# Patient Record
Sex: FEMALE | URBAN - METROPOLITAN AREA
[De-identification: names, ages, dates, MRNs, and addresses within clinical notes are randomized per-mention and may not be internally consistent; named-entity substitution may affect disease eponyms.]

---

## 2022-10-03 ENCOUNTER — TRANSCRIBE ORDERS (OUTPATIENT)
Dept: DIABETES SERVICES | Facility: HOSPITAL | Age: 57
End: 2022-10-03

## 2022-10-03 DIAGNOSIS — E11.9 TYPE 2 DIABETES MELLITUS WITHOUT COMPLICATION, WITHOUT LONG-TERM CURRENT USE OF INSULIN: Primary | ICD-10-CM

## 2022-10-06 ENCOUNTER — TRANSCRIBE ORDERS (OUTPATIENT)
Dept: DIABETES SERVICES | Facility: HOSPITAL | Age: 57
End: 2022-10-06

## 2022-10-06 DIAGNOSIS — E11.9 TYPE 2 DIABETES MELLITUS WITHOUT COMPLICATION, WITHOUT LONG-TERM CURRENT USE OF INSULIN: Primary | ICD-10-CM

## 2024-03-31 PROBLEM — R19.00 PELVIC MASS IN FEMALE: Status: ACTIVE | Noted: 2024-03-31

## 2024-03-31 NOTE — PROGRESS NOTES
Yasmeen Rainey  [id number]  1965      Reason for visit:  8.3 cm Complex left ovarian cyst     Consultation:  Patient is being seen at the request of Winnie Bal MD      History of present illness:  The patient is a 59 y.o. year old female who presents today for treatment and evaluation of the above issues.    Patient initially had right hip and leg pain and underwent an MRI back for work-up which revealed a cyst on kidney.  She then underwent recent CT abd/pelvis on 2/28/2024 which revealed a pelvic mass.  This was then followed by pelvic U/S on 3/7/24 which revealed a large cystic structure associated with the left adnexa with complex wall echoes/nodularity and possible marginal septations measuring 8.3 x 3.5 x 5.2 cm; no free fluid noted.  CA-125 was normal at 8.2 U/ml.     Patient continues to have right hip and leg pain due to bulging disc.  Denies any abdominal pain/pelvic pain.  Appetite decreased with occasional nausea but thinks related to antibiotics that she's taking for cellulitis of leg.  + early satiety. No vomiting.  Weight loss of 5 pounds over last few months.  No abdominal bloating. No chest pain or shortness of breath.  No vaginal bleeding.  Regular BM.  Urinating without difficulties.  No fevers/chills.       Sees urologist on 4/11 for left kidney cyst that was noted on imaging as well.     Patient with prior history of abdominal hysterectomy in 2002 or 2003 secondary to uterine prolapse.  Both ovaries/fallopian tubes left in place.        For new patients, [facility] intake form from 4/1/2024 was reviewed and confirmed.    CT abd/pelvis (2/28/2024):    There is a 10.7 x 9.7 x 8.7 cm cystic mass arising from the superior pole of the left kidney with thin internal septation associated mild peripheral calcifications.  Bosniak 2 cystic mass.  Status post hysterectomy.  10 x 4 x 5.5 cm left adnexal cystic mass.  Fatty liver.    PELVIC U/S (3/7/2024):    Large cystic structure associated with the  left adnexa with complex wall echoes/nodularity and possible marginal septations measuring 8.3 x 3.5 x 5.2 cm.  Visualized ovarian parenchyma appears normal with pulsatile arterial flow.  No free fluid.  Uterus is absent.      CA-125  (3/13/2024):    8.2 U/ml       OBGYN History:  She is a .  (1 c/section)   She does not use HRT. She does not  have a history of abnormal pap smears.  Prior abdominal hysterectomy in  for uterine prolapse     Last mammogram:  2024  normal per patient   Last colonoscopy:  never     Oncologic History:  Oncology/Hematology History    No history exists.         Past Medical History:   Diagnosis Date    Anxiety     Back pain     Bone pain     Depression     High blood pressure     Joint pain     Muscle pain        Past Surgical History:   Procedure Laterality Date    APPENDECTOMY       SECTION      GALLBLADDER SURGERY      HYSTERECTOMY      Partial       MEDICATIONS:    Current Outpatient Medications:     amLODIPine (NORVASC) 10 MG tablet, Take 1 tablet by mouth Every Evening., Disp: , Rfl:     atorvastatin (LIPITOR) 80 MG tablet, TAKE ONE TABLET BY MOUTH EACH NIGHT AT BEDTIME, Disp: , Rfl:     diclofenac (VOLTAREN) 75 MG EC tablet, Take 1 tablet by mouth 2 (Two) Times a Day With Meals., Disp: , Rfl:     doxycycline (MONODOX) 100 MG capsule, Take 1 capsule by mouth Every 12 (Twelve) Hours., Disp: , Rfl:     Lancets (OneTouch Delica Plus Prcyhn74Q) misc, 1 each by Other route 3 (Three) Times a Day. use to test blood sugar 3 times daily, Disp: , Rfl:     Lantus SoloStar 100 UNIT/ML injection pen, INJECT 10 UNITS UNDER THE SKIN EVERY DAY, Disp: , Rfl:     metFORMIN ER (GLUCOPHAGE-XR) 750 MG 24 hr tablet, Take 1 tablet by mouth Daily., Disp: , Rfl:     methocarbamol (ROBAXIN) 500 MG tablet, Take 1 tablet by mouth 3 (Three) Times a Day As Needed. for pain, Disp: , Rfl:     metoprolol succinate XL (TOPROL-XL) 50 MG 24 hr tablet, Take 1 tablet by mouth Daily., Disp: , Rfl:      mupirocin (BACTROBAN) 2 % ointment, Apply  topically to the appropriate area as directed See Admin Instructions. Apply topically three times daily, Disp: , Rfl:     OneTouch Verio test strip, 1 each by Other route 3 (Three) Times a Day. use to test blood sugar 3 times daily, Disp: , Rfl:     pioglitazone (ACTOS) 30 MG tablet, Take 1 tablet by mouth Daily., Disp: , Rfl:     UltiCare Alcohol Swabs 70 % pads, USE FOR blood sugar checks AND insulin injections, Disp: , Rfl:     valsartan (DIOVAN) 320 MG tablet, Take 1 tablet by mouth Daily., Disp: , Rfl:     venlafaxine XR (EFFEXOR-XR) 150 MG 24 hr capsule, TAKE ONE CAPSULE BY MOUTH EACH NIGHT AT BEDTIME, Disp: , Rfl:      Allergies:  is allergic to morphine and penicillins.    Social History:   Social History     Socioeconomic History    Marital status:    Tobacco Use    Smoking status: Every Day     Current packs/day: 0.25     Average packs/day: 0.2 packs/day for 42.2 years (10.6 ttl pk-yrs)     Types: Cigarettes     Start date: 1982    Smokeless tobacco: Never   Vaping Use    Vaping status: Never Used   Substance and Sexual Activity    Alcohol use: Not Currently    Drug use: Never    Sexual activity: Not Currently     Partners: Male     Birth control/protection: Hysterectomy       Family History:    Family History   Problem Relation Age of Onset    Lung cancer Father     Diabetes Mother     Heart disease Mother     Heart attack Brother     Heart attack Brother     Diabetes Brother     Heart failure Sister     Hypertension Sister     Anemia Daughter     Depression Daughter     Anxiety disorder Daughter        Health Maintenance:    Health Maintenance   Topic Date Due    MAMMOGRAM  Never done    URINE MICROALBUMIN  Never done    BMI FOLLOWUP  Never done    COLORECTAL CANCER SCREENING  Never done    Pneumococcal Vaccine 0-64 (1 of 2 - PCV) Never done    DIABETIC EYE EXAM  Never done    Hepatitis B (1 of 3 - 19+ 3-dose series) Never done    TDAP/TD VACCINES (1  "- Tdap) Never done    ZOSTER VACCINE (1 of 2) Never done    HEPATITIS C SCREENING  Never done    ANNUAL PHYSICAL  Never done    DIABETIC FOOT EXAM  Never done    PAP SMEAR  Never done    HEMOGLOBIN A1C  Never done    COVID-19 Vaccine (1 - 2023-24 season) Never done    INFLUENZA VACCINE  08/01/2024       Review of Systems:  Please refer to history of present illness.  Review of systems otherwise negative.  Physical Exam:  Vitals:    04/01/24 0931   BP: 177/88   Pulse: 92   Resp: 17   Temp: 97.3 °F (36.3 °C)   TempSrc: Temporal   SpO2: 95%   Weight: 88.5 kg (195 lb 1.6 oz)   Height: 160 cm (63\")   PainSc:   5   PainLoc: Leg  Comment: Right leg, Buldging disc     Body mass index is 34.56 kg/m².  Wt Readings from Last 3 Encounters:   04/01/24 88.5 kg (195 lb 1.6 oz)     PHQ-9 Depression Screening  Little interest or pleasure in doing things? 0-->not at all   Feeling down, depressed, or hopeless? 0-->not at all   Trouble falling or staying asleep, or sleeping too much?     Feeling tired or having little energy?     Poor appetite or overeating?     Feeling bad about yourself - or that you are a failure or have let yourself or your family down?     Trouble concentrating on things, such as reading the newspaper or watching television?     Moving or speaking so slowly that other people could have noticed? Or the opposite - being so fidgety or restless that you have been moving around a lot more than usual?     Thoughts that you would be better off dead, or of hurting yourself in some way?     PHQ-9 Total Score 0   If you checked off any problems, how difficult have these problems made it for you to do your work, take care of things at home, or get along with other people?         GENERAL: Alert, well-appearing female appearing her stated age who is in no apparent distress.   Obese   HEENT: Sclera anicteric. Head normocephalic, atraumatic. Mucus membranes moist.   NECK: Trachea midline, supple, without masses.  No " "thyromegaly.   BREASTS: Deferred  CARDIOVASCULAR: Normal rate, regular rhythm, no murmurs, rubs, or gallops.  No peripheral edema.  RESPIRATORY: Clear to auscultation bilaterally, normal respiratory effort  BACK:  No CVA tenderness, no vertebral tenderness on palpation  GASTROINTESTINAL:  Abdomen is soft, non-tender, non-distended, no rebound or guarding, no masses, or hernias. No HSM.  Prior well healed abdominal Incisions noted.    SKIN:  Warm, dry, well-perfused.  All visible areas intact.  Patient noted to have scratches along left lower extremity with bandage noted along left posterior thigh.   PSYCHIATRIC: AO x3, with appropriate affect, normal thought processes.  NEUROLOGIC: No focal deficits.  Moves extremities well.  MUSCULOSKELETAL: Normal gait and station.   EXTREMITIES:   No cyanosis, clubbing, symmetric.  LYMPHATICS:  No cervical or inguinal adenopathy noted.     PELVIC exam:  External genitalia are free from lesion. On speculum examination, the vaginal cuff was intact and no lesions were appreciated.  On bimanual examination, no fullness was appreciated.  Uterus, cervix were absent.  There was no significant tenderness.  Unable to appreciate adnexal mass noted on imaging.   Rectovaginal exam with smooth rectovaginal septum.  Rectal sphincter tone is normal.      ECOG PS 0    PROCEDURES:  None    Diagnostic Data:    No Images in the past 120 days found..    No results found for: \"WBC\", \"HGB\", \"HCT\", \"MCV\", \"PLT\", \"NEUTROABS\", \"GLUCOSE\", \"BUN\", \"CREATININE\", \"EGFRIFNONA\", \"EGFRIFAFRI\", \"NA\", \"K\", \"CL\", \"CO2\", \"MG\", \"PHOS\", \"CALCIUM\", \"ALBUMIN\", \"AST\", \"ALT\", \"BILITOT\"  No results found for: \"TSH\"  No results found for: \"FT4\"  No results found for: \"\"      Assessment & Plan   This is a 59 y.o. woman with 8.3 cm complex left ovarian cyst and normal CA-125 presenting for evaluation.        Encounter Diagnosis   Name Primary?    Pelvic mass in female Yes       Complex left ovarian mass.  Discussed " various etiologies of ovarian mass with patient and her daughter in detail including benign and malignant etiologies.  CT and ultrasound images were reviewed.  Patient has a mostly cystic elongated lesion with possible hydrosalpinx and slight septations peripherally.  Patient with intermediate risk lesion given septations though overall appears low risk for malignancy.  Recent  is normal.  Recommendations are to proceed forward surgical resection.  Plan to remove both ovaries and fallopian tubes.  Discussed at the time of the surgery once the left ovary and fallopian tube were removed this to be sent to pathology for frozen section evaluation.  In the event of an ovarian malignancy, will then need to proceed forward with surgical staging consisting of removal of pelvic and periotic lymph nodes as well as omentum and staging biopsies.  Plan is to remove both ovaries and fallopian tubes regardless of diagnosis.  Patient is status post prior hysterectomy.          --Plan for robotic assisted laparoscopic removal of both ovaries and fallopian tubes with possible staging and removal of pelvic periotic lymph nodes, omentum, staging biopsies.        -- Discussed risks of surgery including but not limited to infection, bleeding, injury to neighboring organs such as bowel, bladder, ureters, neighboring blood vessels and nerves, risk of blood clot, lymphedema, death.  Patient understands risks and agrees to proceed forward as outlined above.  Patient is agreeable to blood transfusion if needed.        -- Patient understands that surgery likely will be with Dr. Vegas when she is back from vacation.    2.  History of multiple comorbidities including hypertension, hyperlipidemia, diabetes.  Plan for medical clearance per PCP.  Obtain preoperative labs, chest x-ray and EKG.    3.  10 cm cystic mass involving left kidney, Bosniak 2 on CT imaging.  Patient following up with urology on April 11 for recommendations.      Pain  assessment was performed today as a part of patient’s care.  For patients with pain related to surgery, gynecologic malignancy or cancer treatment, the plan is as noted in the assessment/plan.  For patients with pain not related to these issues, they are to seek any further needed care from a more appropriate provider, such as PCP.      No orders of the defined types were placed in this encounter.      FOLLOW UP: No follow-ups on file.    I spent 60 minutes caring for Yasmeen on this date of service. This time includes time spent by me in the following activities: preparing for the visit, reviewing tests, obtaining and/or reviewing a separately obtained history, performing a medically appropriate examination and/or evaluation, counseling and educating the patient/family/caregiver, ordering medications, tests, or procedures, referring and communicating with other health care professionals, documenting information in the medical record, and care coordination      Electronically Signed by: Keara Cevallos MD  Date: 4/1/2024

## 2024-03-31 NOTE — H&P (VIEW-ONLY)
Yasmeen Rainey  [id number]  1965      Reason for visit:  8.3 cm Complex left ovarian cyst     Consultation:  Patient is being seen at the request of Winnie Bal MD      History of present illness:  The patient is a 59 y.o. year old female who presents today for treatment and evaluation of the above issues.    Patient initially had right hip and leg pain and underwent an MRI back for work-up which revealed a cyst on kidney.  She then underwent recent CT abd/pelvis on 2/28/2024 which revealed a pelvic mass.  This was then followed by pelvic U/S on 3/7/24 which revealed a large cystic structure associated with the left adnexa with complex wall echoes/nodularity and possible marginal septations measuring 8.3 x 3.5 x 5.2 cm; no free fluid noted.  CA-125 was normal at 8.2 U/ml.     Patient continues to have right hip and leg pain due to bulging disc.  Denies any abdominal pain/pelvic pain.  Appetite decreased with occasional nausea but thinks related to antibiotics that she's taking for cellulitis of leg.  + early satiety. No vomiting.  Weight loss of 5 pounds over last few months.  No abdominal bloating. No chest pain or shortness of breath.  No vaginal bleeding.  Regular BM.  Urinating without difficulties.  No fevers/chills.       Sees urologist on 4/11 for left kidney cyst that was noted on imaging as well.     Patient with prior history of abdominal hysterectomy in 2002 or 2003 secondary to uterine prolapse.  Both ovaries/fallopian tubes left in place.        For new patients, [facility] intake form from 4/1/2024 was reviewed and confirmed.    CT abd/pelvis (2/28/2024):    There is a 10.7 x 9.7 x 8.7 cm cystic mass arising from the superior pole of the left kidney with thin internal septation associated mild peripheral calcifications.  Bosniak 2 cystic mass.  Status post hysterectomy.  10 x 4 x 5.5 cm left adnexal cystic mass.  Fatty liver.    PELVIC U/S (3/7/2024):    Large cystic structure associated with the  left adnexa with complex wall echoes/nodularity and possible marginal septations measuring 8.3 x 3.5 x 5.2 cm.  Visualized ovarian parenchyma appears normal with pulsatile arterial flow.  No free fluid.  Uterus is absent.      CA-125  (3/13/2024):    8.2 U/ml       OBGYN History:  She is a .  (1 c/section)   She does not use HRT. She does not  have a history of abnormal pap smears.  Prior abdominal hysterectomy in  for uterine prolapse     Last mammogram:  2024  normal per patient   Last colonoscopy:  never     Oncologic History:  Oncology/Hematology History    No history exists.         Past Medical History:   Diagnosis Date    Anxiety     Back pain     Bone pain     Depression     High blood pressure     Joint pain     Muscle pain        Past Surgical History:   Procedure Laterality Date    APPENDECTOMY       SECTION      GALLBLADDER SURGERY      HYSTERECTOMY      Partial       MEDICATIONS:    Current Outpatient Medications:     amLODIPine (NORVASC) 10 MG tablet, Take 1 tablet by mouth Every Evening., Disp: , Rfl:     atorvastatin (LIPITOR) 80 MG tablet, TAKE ONE TABLET BY MOUTH EACH NIGHT AT BEDTIME, Disp: , Rfl:     diclofenac (VOLTAREN) 75 MG EC tablet, Take 1 tablet by mouth 2 (Two) Times a Day With Meals., Disp: , Rfl:     doxycycline (MONODOX) 100 MG capsule, Take 1 capsule by mouth Every 12 (Twelve) Hours., Disp: , Rfl:     Lancets (OneTouch Delica Plus [id number]) misc, 1 each by Other route 3 (Three) Times a Day. use to test blood sugar 3 times daily, Disp: , Rfl:     Lantus SoloStar 100 UNIT/ML injection pen, INJECT 10 UNITS UNDER THE SKIN EVERY DAY, Disp: , Rfl:     metFORMIN ER (GLUCOPHAGE-XR) 750 MG 24 hr tablet, Take 1 tablet by mouth Daily., Disp: , Rfl:     methocarbamol (ROBAXIN) 500 MG tablet, Take 1 tablet by mouth 3 (Three) Times a Day As Needed. for pain, Disp: , Rfl:     metoprolol succinate XL (TOPROL-XL) 50 MG 24 hr tablet, Take 1 tablet by mouth Daily., Disp: , Rfl:      mupirocin (BACTROBAN) 2 % ointment, Apply  topically to the appropriate area as directed See Admin Instructions. Apply topically three times daily, Disp: , Rfl:     OneTouch Verio test strip, 1 each by Other route 3 (Three) Times a Day. use to test blood sugar 3 times daily, Disp: , Rfl:     pioglitazone (ACTOS) 30 MG tablet, Take 1 tablet by mouth Daily., Disp: , Rfl:     UltiCare Alcohol Swabs 70 % pads, USE FOR blood sugar checks AND insulin injections, Disp: , Rfl:     valsartan (DIOVAN) 320 MG tablet, Take 1 tablet by mouth Daily., Disp: , Rfl:     venlafaxine XR (EFFEXOR-XR) 150 MG 24 hr capsule, TAKE ONE CAPSULE BY MOUTH EACH NIGHT AT BEDTIME, Disp: , Rfl:      Allergies:  is allergic to morphine and penicillins.    Social History:   Social History     Socioeconomic History    Marital status:    Tobacco Use    Smoking status: Every Day     Current packs/day: 0.25     Average packs/day: 0.2 packs/day for 42.2 years (10.6 ttl pk-yrs)     Types: Cigarettes     Start date: 1982    Smokeless tobacco: Never   Vaping Use    Vaping status: Never Used   Substance and Sexual Activity    Alcohol use: Not Currently    Drug use: Never    Sexual activity: Not Currently     Partners: Male     Birth control/protection: Hysterectomy       Family History:    Family History   Problem Relation Age of Onset    Lung cancer Father     Diabetes Mother     Heart disease Mother     Heart attack Brother     Heart attack Brother     Diabetes Brother     Heart failure Sister     Hypertension Sister     Anemia Daughter     Depression Daughter     Anxiety disorder Daughter        Health Maintenance:    Health Maintenance   Topic Date Due    MAMMOGRAM  Never done    URINE MICROALBUMIN  Never done    BMI FOLLOWUP  Never done    COLORECTAL CANCER SCREENING  Never done    Pneumococcal Vaccine 0-64 (1 of 2 - PCV) Never done    DIABETIC EYE EXAM  Never done    Hepatitis B (1 of 3 - 19+ 3-dose series) Never done    TDAP/TD VACCINES (1  "- Tdap) Never done    ZOSTER VACCINE (1 of 2) Never done    HEPATITIS C SCREENING  Never done    ANNUAL PHYSICAL  Never done    DIABETIC FOOT EXAM  Never done    PAP SMEAR  Never done    HEMOGLOBIN A1C  Never done    COVID-19 Vaccine (1 - 2023-24 season) Never done    INFLUENZA VACCINE  08/01/2024       Review of Systems:  Please refer to history of present illness.  Review of systems otherwise negative.  Physical Exam:  Vitals:    04/01/24 0931   BP: 177/88   Pulse: 92   Resp: 17   Temp: 97.3 °F (36.3 °C)   TempSrc: Temporal   SpO2: 95%   Weight: 88.5 kg (195 lb 1.6 oz)   Height: 160 cm (63\")   PainSc:   5   PainLoc: Leg  Comment: Right leg, Buldging disc     Body mass index is 34.56 kg/m².  Wt Readings from Last 3 Encounters:   04/01/24 88.5 kg (195 lb 1.6 oz)     PHQ-9 Depression Screening  Little interest or pleasure in doing things? 0-->not at all   Feeling down, depressed, or hopeless? 0-->not at all   Trouble falling or staying asleep, or sleeping too much?     Feeling tired or having little energy?     Poor appetite or overeating?     Feeling bad about yourself - or that you are a failure or have let yourself or your family down?     Trouble concentrating on things, such as reading the newspaper or watching television?     Moving or speaking so slowly that other people could have noticed? Or the opposite - being so fidgety or restless that you have been moving around a lot more than usual?     Thoughts that you would be better off dead, or of hurting yourself in some way?     PHQ-9 Total Score 0   If you checked off any problems, how difficult have these problems made it for you to do your work, take care of things at home, or get along with other people?         GENERAL: Alert, well-appearing female appearing her stated age who is in no apparent distress.   Obese   HEENT: Sclera anicteric. Head normocephalic, atraumatic. Mucus membranes moist.   NECK: Trachea midline, supple, without masses.  No " "thyromegaly.   BREASTS: Deferred  CARDIOVASCULAR: Normal rate, regular rhythm, no murmurs, rubs, or gallops.  No peripheral edema.  RESPIRATORY: Clear to auscultation bilaterally, normal respiratory effort  BACK:  No CVA tenderness, no vertebral tenderness on palpation  GASTROINTESTINAL:  Abdomen is soft, non-tender, non-distended, no rebound or guarding, no masses, or hernias. No HSM.  Prior well healed abdominal Incisions noted.    SKIN:  Warm, dry, well-perfused.  All visible areas intact.  Patient noted to have scratches along left lower extremity with bandage noted along left posterior thigh.   PSYCHIATRIC: AO x3, with appropriate affect, normal thought processes.  NEUROLOGIC: No focal deficits.  Moves extremities well.  MUSCULOSKELETAL: Normal gait and station.   EXTREMITIES:   No cyanosis, clubbing, symmetric.  LYMPHATICS:  No cervical or inguinal adenopathy noted.     PELVIC exam:  External genitalia are free from lesion. On speculum examination, the vaginal cuff was intact and no lesions were appreciated.  On bimanual examination, no fullness was appreciated.  Uterus, cervix were absent.  There was no significant tenderness.  Unable to appreciate adnexal mass noted on imaging.   Rectovaginal exam with smooth rectovaginal septum.  Rectal sphincter tone is normal.      ECOG PS 0    PROCEDURES:  None    Diagnostic Data:    No Images in the past 120 days found..    No results found for: \"WBC\", \"HGB\", \"HCT\", \"MCV\", \"PLT\", \"NEUTROABS\", \"GLUCOSE\", \"BUN\", \"CREATININE\", \"EGFRIFNONA\", \"EGFRIFAFRI\", \"NA\", \"K\", \"CL\", \"CO2\", \"MG\", \"PHOS\", \"CALCIUM\", \"ALBUMIN\", \"AST\", \"ALT\", \"BILITOT\"  No results found for: \"TSH\"  No results found for: \"FT4\"  No results found for: \"\"      Assessment & Plan   This is a 59 y.o. woman with 8.3 cm complex left ovarian cyst and normal CA-125 presenting for evaluation.        Encounter Diagnosis   Name Primary?    Pelvic mass in female Yes       Complex left ovarian mass.  Discussed " various etiologies of ovarian mass with patient and her daughter in detail including benign and malignant etiologies.  CT and ultrasound images were reviewed.  Patient has a mostly cystic elongated lesion with possible hydrosalpinx and slight septations peripherally.  Patient with intermediate risk lesion given septations though overall appears low risk for malignancy.  Recent  is normal.  Recommendations are to proceed forward surgical resection.  Plan to remove both ovaries and fallopian tubes.  Discussed at the time of the surgery once the left ovary and fallopian tube were removed this to be sent to pathology for frozen section evaluation.  In the event of an ovarian malignancy, will then need to proceed forward with surgical staging consisting of removal of pelvic and periotic lymph nodes as well as omentum and staging biopsies.  Plan is to remove both ovaries and fallopian tubes regardless of diagnosis.  Patient is status post prior hysterectomy.          --Plan for robotic assisted laparoscopic removal of both ovaries and fallopian tubes with possible staging and removal of pelvic periotic lymph nodes, omentum, staging biopsies.        -- Discussed risks of surgery including but not limited to infection, bleeding, injury to neighboring organs such as bowel, bladder, ureters, neighboring blood vessels and nerves, risk of blood clot, lymphedema, death.  Patient understands risks and agrees to proceed forward as outlined above.  Patient is agreeable to blood transfusion if needed.        -- Patient understands that surgery likely will be with Dr. Vegas when she is back from vacation.    2.  History of multiple comorbidities including hypertension, hyperlipidemia, diabetes.  Plan for medical clearance per PCP.  Obtain preoperative labs, chest x-ray and EKG.    3.  10 cm cystic mass involving left kidney, Bosniak 2 on CT imaging.  Patient following up with urology on April 11 for recommendations.      Pain  assessment was performed today as a part of patient’s care.  For patients with pain related to surgery, gynecologic malignancy or cancer treatment, the plan is as noted in the assessment/plan.  For patients with pain not related to these issues, they are to seek any further needed care from a more appropriate provider, such as PCP.      No orders of the defined types were placed in this encounter.      FOLLOW UP: No follow-ups on file.    I spent 60 minutes caring for Yasmeen on this date of service. This time includes time spent by me in the following activities: preparing for the visit, reviewing tests, obtaining and/or reviewing a separately obtained history, performing a medically appropriate examination and/or evaluation, counseling and educating the patient/family/caregiver, ordering medications, tests, or procedures, referring and communicating with other health care professionals, documenting information in the medical record, and care coordination      Electronically Signed by: Keara Cevallos MD  Date: 4/1/2024

## 2024-04-01 ENCOUNTER — OFFICE VISIT (OUTPATIENT)
Dept: GYNECOLOGIC ONCOLOGY | Facility: CLINIC | Age: 59
End: 2024-04-01
Payer: MEDICAID

## 2024-04-01 ENCOUNTER — PREP FOR SURGERY (OUTPATIENT)
Dept: OTHER | Facility: HOSPITAL | Age: 59
End: 2024-04-01
Payer: MEDICAID

## 2024-04-01 VITALS
DIASTOLIC BLOOD PRESSURE: 88 MMHG | WEIGHT: 195.1 LBS | SYSTOLIC BLOOD PRESSURE: 177 MMHG | HEIGHT: 63 IN | RESPIRATION RATE: 17 BRPM | TEMPERATURE: 97.3 F | HEART RATE: 92 BPM | BODY MASS INDEX: 34.57 KG/M2 | OXYGEN SATURATION: 95 %

## 2024-04-01 DIAGNOSIS — R19.00 PELVIC MASS IN FEMALE: Primary | ICD-10-CM

## 2024-04-01 DIAGNOSIS — R19.00 PELVIC MASS: Primary | ICD-10-CM

## 2024-04-01 PROCEDURE — 1125F AMNT PAIN NOTED PAIN PRSNT: CPT | Performed by: OBSTETRICS & GYNECOLOGY

## 2024-04-01 PROCEDURE — 99205 OFFICE O/P NEW HI 60 MIN: CPT | Performed by: OBSTETRICS & GYNECOLOGY

## 2024-04-01 RX ORDER — CELECOXIB 200 MG/1
200 CAPSULE ORAL ONCE
OUTPATIENT
Start: 2024-04-01 | End: 2024-04-01

## 2024-04-01 RX ORDER — LANCETS 30 GAUGE
1 EACH MISCELLANEOUS 3 TIMES DAILY
COMMUNITY
Start: 2024-03-18

## 2024-04-01 RX ORDER — METOPROLOL SUCCINATE 50 MG/1
1 TABLET, EXTENDED RELEASE ORAL DAILY
COMMUNITY
Start: 2024-03-20

## 2024-04-01 RX ORDER — METFORMIN HYDROCHLORIDE 750 MG/1
1 TABLET, EXTENDED RELEASE ORAL DAILY
COMMUNITY

## 2024-04-01 RX ORDER — BLOOD SUGAR DIAGNOSTIC
1 STRIP MISCELLANEOUS 3 TIMES DAILY
COMMUNITY
Start: 2024-03-18

## 2024-04-01 RX ORDER — SCOLOPAMINE TRANSDERMAL SYSTEM 1 MG/1
1 PATCH, EXTENDED RELEASE TRANSDERMAL CONTINUOUS
OUTPATIENT
Start: 2024-04-01 | End: 2024-04-04

## 2024-04-01 RX ORDER — AMLODIPINE BESYLATE 10 MG/1
1 TABLET ORAL EVERY EVENING
COMMUNITY

## 2024-04-01 RX ORDER — SODIUM CHLORIDE 9 MG/ML
40 INJECTION, SOLUTION INTRAVENOUS AS NEEDED
OUTPATIENT
Start: 2024-04-01

## 2024-04-01 RX ORDER — METHOCARBAMOL 500 MG/1
500 TABLET, FILM COATED ORAL 3 TIMES DAILY PRN
COMMUNITY
Start: 2024-01-24

## 2024-04-01 RX ORDER — SODIUM CHLORIDE 0.9 % (FLUSH) 0.9 %
10 SYRINGE (ML) INJECTION EVERY 12 HOURS SCHEDULED
OUTPATIENT
Start: 2024-04-01

## 2024-04-01 RX ORDER — DICLOFENAC SODIUM 75 MG/1
1 TABLET, DELAYED RELEASE ORAL 2 TIMES DAILY WITH MEALS
COMMUNITY

## 2024-04-01 RX ORDER — DOXYCYCLINE 100 MG/1
1 CAPSULE ORAL EVERY 12 HOURS SCHEDULED
COMMUNITY
Start: 2024-03-20

## 2024-04-01 RX ORDER — PREGABALIN 150 MG/1
150 CAPSULE ORAL ONCE
OUTPATIENT
Start: 2024-04-01 | End: 2024-04-01

## 2024-04-01 RX ORDER — PIOGLITAZONEHYDROCHLORIDE 30 MG/1
1 TABLET ORAL DAILY
COMMUNITY

## 2024-04-01 RX ORDER — ACETAMINOPHEN 500 MG
1000 TABLET ORAL ONCE
OUTPATIENT
Start: 2024-04-01 | End: 2024-04-01

## 2024-04-01 RX ORDER — VENLAFAXINE HYDROCHLORIDE 150 MG/1
CAPSULE, EXTENDED RELEASE ORAL
COMMUNITY
Start: 2024-03-18

## 2024-04-01 RX ORDER — ATORVASTATIN CALCIUM 80 MG/1
TABLET, FILM COATED ORAL
COMMUNITY

## 2024-04-01 RX ORDER — SODIUM CHLORIDE 0.9 % (FLUSH) 0.9 %
10 SYRINGE (ML) INJECTION AS NEEDED
OUTPATIENT
Start: 2024-04-01

## 2024-04-01 RX ORDER — VALSARTAN 320 MG/1
1 TABLET ORAL DAILY
COMMUNITY
Start: 2024-03-18

## 2024-04-01 RX ORDER — HEPARIN SODIUM 5000 [USP'U]/ML
5000 INJECTION, SOLUTION INTRAVENOUS; SUBCUTANEOUS ONCE
OUTPATIENT
Start: 2024-04-01 | End: 2024-04-01

## 2024-04-01 RX ORDER — ALCOHOL ANTISEPTIC PADS
PADS, MEDICATED (EA) TOPICAL
COMMUNITY
Start: 2024-03-18

## 2024-04-01 RX ORDER — INSULIN GLARGINE 100 [IU]/ML
INJECTION, SOLUTION SUBCUTANEOUS
COMMUNITY
Start: 2024-03-21

## 2024-04-01 NOTE — PATIENT INSTRUCTIONS
Surgery Instructions            Yasmeen Rainey  [id number]  1965      SURGEON:  Leanna Vegas MD    Surgery Coordinator: Nancy BURNS    Gynecological Oncology  [address] [facility] [address] [place] [address], [facility]  Phone: [phone]                   Fax: [phone]      Pre-Admission Testing Appointment    You will see your primary care provider on 4/8/24 at 200.  You have a Pre-Admission Testing (PAT) appointment on 04/16/2024  at 1230  You will need to be at hospital registration 10 minutes before that time. Directions to PAT and Registration will be listed below.    We understand your time is valuable. To prevent delays, please bring the following to your PAT apt. if it applies to you:  Written physician orders (if given to you by your physician)  All medications in the original bottles including over-the-counter medications (not a list)  Copy of living will or power of  documents   Copy of recent test results (EKG, stress test, echo, heart cath, etc.)  Copy of pacemaker or ICD cards and date of last interrogation   Copy of cardiac clearance letter from your cardiologist or primary care physician if history of heart problems  Name and phone number of your pharmacy, primary care physician and/or cardiologist  CPAP or BiPAP settings    Surgery Appointment      Your surgery has been scheduled on 04/23/2024.  You will need to go to Main Registration to check in at 0600. Then you will be sent to the [address] [place] [address] second floor surgery registration desk to check in.    Nothing by mouth after midnight on 04/22/2024.    If you are feeling sick, have a fever or cough and have seen your PCP let our office know 48 hours prior to surgery. It may be subject to rescheduling.       The Day of Surgery:    Do not chew gum or tobacco, smoke, or eat mints or hard candy. Shower and wash your hair. You may brush your teeth but do not swallow water. Use any wipes that Pre-admission  testing has given you.     Please arrive for surgery as instructed by the pre-op nurse, often one to two hours before your surgery.  Once you are called to go to your pre-op room, no one will be allowed in the pre op room.   Please note no one under age 12 is permitted to stay in the waiting area without supervision.  Remove all jewelry, including rings and piercings. Do not bring valuables to the hospital.  Wear loose-fitting clothing.  Avoid wearing eye makeup or contact lenses  We make every effort to begin surgery at your scheduled start time but delays do occur. We will keep you and your family updated about any delays  Please note: you MUST have a  over the age of 18 to drive you home from the hospital. You may not use Uber, Lyft or a taxi.    Please remember to bring:    Photo ID and current medical insurance card  Advanced directives, living will or power of  (if applicable)  Current list of all medications, including over-the- counter and herbal supplements  List of allergies  CPAP device if you have sleep apnea  Any assistive devices or equipment needed after surgery    While You are In the Pre-Op Room:  The nurse will review your health history and will place an IV (into the vein) in your hand or arm for fluids and medicines.  An anesthesia provider will talk with you about anesthesia and pain control during and after surgery.  A member of the surgical team can answer your questions.    Directions to [facility]  [address] [facility] ? [place] [address] ? [phone]    From [address] and [address] [facility]:  Take [address] to the [address] exit. Go right on [address] to [address]. Right on [address] to [address].   Left on [address] to [facility] which is on the left.    From [address] of [place]:  Get off [address] at the [address] exit. Go left on [address] to [address]. Right on [address] to [address]. Left  on   [address] to [facility] which is on the left.     From the South ([address]):  Follow [address] to approximately one mile inside [address]. [facility] is on the right at [address] and   [facility].     Parking:  Free  Parking - Take Entrance 2 off of [address] and go straight ahead to [address] [place] [address].  Self Parking - Take Entrance 1 off of [address], bear left and follow the road to [facility].    Directions to Registration:  If entering through front of [address] [place] [address] ( parking), take a right and proceed up the hallway connecting [address] [place] [address] to   [address] [place] [address]. Registration is on the left about snf up the denson.    If entering from [facility], take garage elevator to first floor (1), exit to the right and proceed through the doors to outside, follow the covered sidewalk to entrance of [facility], follow signs to [address] [place] [address], this leads to the [facility] lobby and information desk. Proceed past the information desk to the hallway that connects [address] [place] [address] to the [facility]. Registration is on the left about snf up the denson.    Directions to Pre-admission Testing:  Follow directions to Registration and Pre-admission Testing is next door to Registration             PREPARING FOR SURGERY  **Disability or Work Release Forms     Work: The amount of time you will be off work after surgery depends on both your surgery and your job. Discuss this with your doctor before surgery. If you have any questions about this, call your doctor.  You must provide all forms completed and signed to the GYN ONCOLOGY office.    FORM FOR AUHTHORIZATION FOR USE AND/OR DISCLOSURE OF PROCTED HEALTH INFORMATION CAN BE PROVIDED UPON REQUEST.    Preoperative Evaluation and Optimization  If your doctor tells you to get a preoperative evaluation from your primary care provider, cardiologist, or other specialist, it is your responsibility to make sure to  "complete these well before your surgery. We want you to get evaluated to make sure you are as healthy as possible when you have your surgery. If the evaluation, including all recommended testing, is not done in time, your surgery will be postponed.    If you take diabetic medications please consult with the prescriber.  Continue antidepressants, Beta Blockers \"olol\", anti-seizure medication, GERD medication (heartburn), Opioids and Parkinson's medication.  Let us know if you have a history of blood clots or are taking a blood thinner before your surgery, this will need to be held and you will need to discuss this with staff.   If you are taking any weight loss medications please let our staff now. Ideally they will need to be held 2 weeks prior to your procedure.  You are allowed 1 visitor that may remain in the waiting room at both locations.  Visitors cannot come back to pre-op or post-op areas.    Please note: you MUST have a  over the age of 18 to drive you home from the hospital. You may not use Uber, Lyft or a taxi.    Physical Fitness  Research shows that getting more physical activity before surgery can lower your risk for problems after surgery. Walking is a great way to improve your fitness level before surgery. Even if you start walking just a few weeks before surgery, it can make a big difference.     Quit Smoking  If you smoke, your risk of having a lung problem is at least twice that of a non-smoker.    Surgical incisions will not heal as well and you have a higher risk of infection  The heart has to work harder.  It is best to quit smoking 6 to 8 weeks before surgery. This gives your lungs more time to recover.    Outpatient Surgery  You will need to have someone bring you to the hospital, stay in the waiting room during your procedure and take you home at discharge. It is recommended that someone stay with you 24 hours after your procedure.     If you live more than a 4-hour drive away from the " hospital, or live in an area without easy access to an emergency department, we recommend you plan to spend another night or two close to the hospital before you go home. For assistance with hotel, prices and vouchers let our office know and we can let you talk with our Oncology Social worker, Silvia Terrazas.     Post-Operative Visit  You will be scheduled a post-operative appointment for 3 weeks after your surgical procedure. If you do not have an appointment please call the office and have that scheduled.     How to prevent nausea  The best way to prevent nausea is to eat frequent small meals. It is especially important to eat something before taking pain medication. Take your ondansetron if you are feeling nauseous do not wait.    Pain Management after Surgery    If you have kidney disease or liver disease and are not to take ibuprofen or Tylenol please let your doctor or nurse know.     Driving: Do not drive while you are taking prescription pain medications.     It is normal to have some pain after surgery. The goal of managing your acute pain after surgery is to minimize your pain so you feel comfortable enough to get up, take deep breaths, wash, get dressed, and do simple tasks in your home. Some discomfort is likely. We do not expect you to be completely free of pain.   Pain is usually worst the first 24-48 hours after surgery.    What can I do to relieve pain without medications?   Apply heat with a warm compress, hot water bottle, or heating pad. Do not put anything hot directly on your skin or lie on top of it.   Apply cooling with a cold gel pack, bag of peas, or crushed ice. Wrap in a soft cloth or towel.   Do not push or press on your incision. It is normal for your incision to be sore for up to 6 weeks if you push on it.   Unless your doctor gives you a different plan, ibuprofen and acetaminophen are the main medicines you will use to manage your pain.   You may also get a prescription for an opioid  such as oxycodone or hydrocodone. Opioids should only be added as needed to reduce pain that is not adequately relieved by ibuprofen and acetaminophen.                                                                                         Typical Pain Medication Schedule  6 am Ibuprofen 600 mg   9 am acetaminophen 650 mg   12:00 pm Noon Ibuprofen 600 mg   3:00 pm Acetaminophen 650 mg   6:00 pm Ibuprofen 600 mg   9:00 pm Acetaminophen 650 mg   12:00 am Midnight  Ibuprofen 600 mg.      What if this schedule does not control my pain?   Please call the office and let us know at [phone]  Reduce the number and frequency of opioids as soon as you can. Do not take more opioid medication than your doctor has prescribed.   Common side effects and risks of opioids include drowsiness, mental confusion, dizziness, nausea, constipation, itching, dry mouth, and slowed breathing.   Never mix opioids with alcohol, sleep aids or anti-anxiety medications. These are dangerous combinations that increase the harmful effects of opioid pain medication. Many overdose deaths from opioids also involve at least one other drug or alcohol.   It is illegal to sell or share an opioid without a prescription properly issued by a licensed health care prescriber.    What is the best way to stop taking pain medications?  1. Stop opioid use.  2. Stop acetaminophen.  3. Gradually decrease how often you take ibuprofen. It is a good idea to take a 600 mg pill before you start a more tiring activity such as going shopping or for a long walk.  Once you get more active, you may have a day when your pain gets a little worse. If this happens, take ibuprofen. If ibuprofen does not relieve the pain, add acetaminophen.    What do I need to know about bowel movements?   Starting as soon as you get home, take 17 grams of Miralax (one capful) twice a day to keep your stool soft and prevent constipation. It is important to prevent constipation because straining  can damage your stitches. Your stool should be as soft as toothpaste. If your stool gets too loose, cut back to using Miralax only once a day.   If you used a bowel prep before surgery, it is common not to have a bowel movement on the first and second day after surgery.   If you have not had a bowel movement by 7 p.m. on the third day after surgery, do one of the following at bedtime:  Drink 1 ounce (2 tablespoons) of Milk of Magnesia (MOM). If you have used MOM before and know you need to take 2 ounces for it to work for you, it is OK to do this, or Take 2 Senekot tablets.   Go for short walks. Walking and being active will help you have a bowel movement.   If you have not had a bowel movement by noon on the fourth day after surgery, call the clinic where you were seen and ask to speak with a nurse.    What kind of vaginal bleeding is normal?  Spotting of pink or red blood from the vagina is normal. Brown-colored discharge that gradually changes to a light yellow or cream color is also normal and can last up to 6 weeks. The brownish discharge is old blood and often has a strong odor, this is okay. Call us if it becomes heavier or foul smelling or you are saturating a maxi pad within an hour.     At Home after Surgery: If you experience a medical emergency call 911 or have someone drive you to your nearest emergency department.     When should I call my doctor?  Call your doctor right away, any time of the day or night, including on weekends and holidays, if you have any of the following signs or symptoms:   A temperature over 100.4°F (38°C) If you don't have one, please buy a thermometer before your surgery.   Heavy bleeding (soaking a regular pad in an hour or less)   Severe pain in your abdomen or pelvis that the pain medication is not helping   Chest pain or difficulty breathing   Swelling, redness, or pain in your legs   An incision that opens   An incision that is red or hot   Fluid or blood leaking from an  incision   New bruising after leaving the hospital that is large or spreading. A little bit of bruising around an incision is normal.   Nausea and vomiting    Skin rash   Unable to urinate at all   Pain or stinging when you pass urine   Blood or cloudiness in your urine   Non-stop urge to pass urine, but only dribbling when you try to go   A sense that something is wrong.    Caring for post-surgical incisions     Do not have vaginal intercourse until your doctor evaluates you at a postop visit and tells you OK.     Showers: You may shower starting 24 hours after your surgery.    NO BATHS: do not take a tub bath up to 6 weeks after surgery.   Do not put any lotion, oil, gel, or powder on or near your incisions.     For incisions inside your vagina: Incisions inside the vagina are closed with dissolvable stitches. When they dissolve you may see little bits of suture material that look like thin pieces of string on your underwear or on toilet tissue after wiping. This is normal. Do not put anything inside the vagina until your doctor evaluates you at a postop visit and tells you when it will be OK.      For incisions on your skin: If there is a dressing over the incision, remove it before your first shower. Leave the slim adhesive strips that are under the dressing in place. During the week after surgery, they will usually curl up at the edges and then come off on their own. If they are still there a week after surgery, gently remove them.  To clean the incisions, first wash your hands, and then get your hands sudsy with soap and gently wash or let the sudsy water run down over the incisions. Dry the incisions well after washing by gently patting with a towel. You may use a blow dryer, but it must be on a low-heat setting.    When will my bladder function get back to normal?   You received extra fluid through your I.V. while you were in the hospital, so it is normal to urinate (pee) more than usual when you first get  home.   It is normal for your bladder function to be different after surgery. You may notice a pause before your urine stream starts or that your urine stream is slower. This will gradually get better, but it may take up to 6 months before you are back to normal. Be patient, relax, and sit on the toilet a little longer.   Drinking more water than usual will not help the bladder recover faster.    What is a normal energy level?  It is normal to have a decreased energy level after surgery. Listen to your body. If you need to rest, do it. Give yourself permission to take it easy. Once you settle into a normal routine at home, you will find that you slowly begin to feel better. Walking around the house and taking short walks outside will help you get back to normal.    What kind of exercise/activities can I do?   Exercise is important for a healthy recovery. We encourage you to begin normal physical activity, like walking, within hours of surgery. Start with short walks and gradually increase the distance and length of time that you walk.   Allow your body time to heal. Do not restart a difficult exercise routine until you have had your post-op exam and your doctor says it is OK.   Lifting: Unless you are given other instructions, for 6 weeks after your surgery do not lift anything over 15 pounds.   Travel: It is best if you do not go far away from home before your postop visit with your doctor. If you have travel plans, talk to your doctor about this before your surgery.      Financial Assistance:    If you have any questions or need assistance, contact your [facility] financial counseling office from 8:30 a.m.-4:30  p.m. Monday through Friday. Closed weekends.   [place]: [phone] or, or visit at [address] [facility], near the entrance.  Financial Assistance Application available upon request      Patient Payments and Correspondence  Customer service representatives are available to assist you  from 8:00 a.m. to 6:00 p.m. Eastern Standard Time by calling [phone] Monday through Friday. You can also contact us through [employer].    [facility]  [address]  [place] [address] [phone]

## 2024-04-16 ENCOUNTER — ANESTHESIA EVENT (OUTPATIENT)
Dept: PERIOP | Facility: HOSPITAL | Age: 59
End: 2024-04-16
Payer: MEDICAID

## 2024-04-16 ENCOUNTER — HOSPITAL ENCOUNTER (OUTPATIENT)
Dept: GENERAL RADIOLOGY | Facility: HOSPITAL | Age: 59
Discharge: HOME | End: 2024-04-16
Payer: MEDICAID

## 2024-04-16 ENCOUNTER — PRE-ADMISSION TESTING (OUTPATIENT)
Dept: PREADMISSION TESTING | Facility: HOSPITAL | Age: 59
End: 2024-04-16
Payer: MEDICAID

## 2024-04-16 VITALS — HEIGHT: 63 IN | BODY MASS INDEX: 35.08 KG/M2 | WEIGHT: 197.97 LBS

## 2024-04-16 DIAGNOSIS — R19.00 PELVIC MASS: Primary | ICD-10-CM

## 2024-04-16 LAB
ABO GROUP BLD: NORMAL
ALBUMIN SERPL-MCNC: 3.9 G/DL (ref 3.5–5.2)
ALBUMIN/GLOB SERPL: 1.1 G/DL
ALP SERPL-CCNC: 148 U/L (ref 39–117)
ALT SERPL W P-5'-P-CCNC: 28 U/L (ref 1–33)
ANION GAP SERPL CALCULATED.3IONS-SCNC: 12 MMOL/L (ref 5–15)
AST SERPL-CCNC: 25 U/L (ref 1–32)
BASOPHILS # BLD AUTO: 0.05 10*3/MM3 (ref 0–0.2)
BASOPHILS NFR BLD AUTO: 0.5 % (ref 0–1.5)
BILIRUB SERPL-MCNC: 0.8 MG/DL (ref 0–1.2)
BUN SERPL-MCNC: 9 MG/DL (ref 6–20)
BUN/CREAT SERPL: 16.1 (ref 7–25)
CALCIUM SPEC-SCNC: 9.4 MG/DL (ref 8.6–10.5)
CHLORIDE SERPL-SCNC: 98 MMOL/L (ref 98–107)
CO2 SERPL-SCNC: 24 MMOL/L (ref 22–29)
CREAT SERPL-MCNC: 0.56 MG/DL (ref 0.57–1)
DEPRECATED RDW RBC AUTO: 40.1 FL (ref 37–54)
EGFRCR SERPLBLD CKD-EPI 2021: 105.3 ML/MIN/1.73
EOSINOPHIL # BLD AUTO: 0.29 10*3/MM3 (ref 0–0.4)
EOSINOPHIL NFR BLD AUTO: 2.7 % (ref 0.3–6.2)
ERYTHROCYTE [DISTWIDTH] IN BLOOD BY AUTOMATED COUNT: 13.4 % (ref 12.3–15.4)
GLOBULIN UR ELPH-MCNC: 3.5 GM/DL
GLUCOSE SERPL-MCNC: 280 MG/DL (ref 65–99)
HCT VFR BLD AUTO: 42.3 % (ref 34–46.6)
HGB BLD-MCNC: 13.3 G/DL (ref 12–15.9)
IMM GRANULOCYTES # BLD AUTO: 0.06 10*3/MM3 (ref 0–0.05)
IMM GRANULOCYTES NFR BLD AUTO: 0.6 % (ref 0–0.5)
LYMPHOCYTES # BLD AUTO: 2.55 10*3/MM3 (ref 0.7–3.1)
LYMPHOCYTES NFR BLD AUTO: 23.7 % (ref 19.6–45.3)
MCH RBC QN AUTO: 25.9 PG (ref 26.6–33)
MCHC RBC AUTO-ENTMCNC: 31.4 G/DL (ref 31.5–35.7)
MCV RBC AUTO: 82.3 FL (ref 79–97)
MONOCYTES # BLD AUTO: 0.57 10*3/MM3 (ref 0.1–0.9)
MONOCYTES NFR BLD AUTO: 5.3 % (ref 5–12)
NEUTROPHILS NFR BLD AUTO: 67.2 % (ref 42.7–76)
NEUTROPHILS NFR BLD AUTO: 7.23 10*3/MM3 (ref 1.7–7)
NRBC BLD AUTO-RTO: 0 /100 WBC (ref 0–0.2)
PLATELET # BLD AUTO: 243 10*3/MM3 (ref 140–450)
PMV BLD AUTO: 10.1 FL (ref 6–12)
POTASSIUM SERPL-SCNC: 4 MMOL/L (ref 3.5–5.2)
PROT SERPL-MCNC: 7.4 G/DL (ref 6–8.5)
RBC # BLD AUTO: 5.14 10*6/MM3 (ref 3.77–5.28)
RH BLD: POSITIVE
SODIUM SERPL-SCNC: 134 MMOL/L (ref 136–145)
WBC NRBC COR # BLD AUTO: 10.75 10*3/MM3 (ref 3.4–10.8)

## 2024-04-16 PROCEDURE — 86900 BLOOD TYPING SEROLOGIC ABO: CPT

## 2024-04-16 PROCEDURE — 71045 X-RAY EXAM CHEST 1 VIEW: CPT

## 2024-04-16 PROCEDURE — 86901 BLOOD TYPING SEROLOGIC RH(D): CPT

## 2024-04-16 PROCEDURE — 36415 COLL VENOUS BLD VENIPUNCTURE: CPT

## 2024-04-16 PROCEDURE — 93005 ELECTROCARDIOGRAM TRACING: CPT

## 2024-04-16 PROCEDURE — 80053 COMPREHEN METABOLIC PANEL: CPT

## 2024-04-16 PROCEDURE — 85025 COMPLETE CBC W/AUTO DIFF WBC: CPT

## 2024-04-16 NOTE — PAT
Patient to apply Chlorhexadine wipes  to surgical area (as instructed) the night before procedure and the AM of procedure. Wipes provided.    Patient directed to Radiology Department for CXR after Pre Admission Testing Appointment.     Per Anesthesia Request, patient instructed not to take their ACE/ARB medications on the AM of surgery.    Patient viewed general PAT education video as instructed in their preoperative information received from their surgeon.  Patient stated the general PAT education video was viewed in its entirety and survey completed.  Copies of [facility] general education handouts (Incentive Spirometry, Meds to Beds Program, Patient Belongings, Pre-op skin preparation instructions, Blood Glucose testing, Visitor policy, Surgery FAQ, Code H) distributed to patient if not printed. Education related to the PAT pass and skin preparation for surgery (if applicable) completed in PAT as a reinforcement to PAT education video. Patient instructed to return PAT pass provided today as well as completed skin preparation sheet (if applicable) on the day of procedure.     Additionally if patient had not viewed video yet but intended to view it at home or in our waiting area, then referred them to the handout with QR code/link provided during PAT visit.  Instructed patient to complete survey after viewing the video in its entirety.  Encouraged patient/family to read [facility] general education handouts thoroughly and notify PAT staff with any questions or concerns. Patient verbalized understanding of all information and priority content.    Too early to draw type and screen in PAT.  Please obtain blood bank specimen in pre-op on the day of surgery.

## 2024-04-18 LAB
QT INTERVAL: 388 MS
QTC INTERVAL: 439 MS

## 2024-04-22 ENCOUNTER — TELEPHONE (OUTPATIENT)
Dept: GYNECOLOGIC ONCOLOGY | Facility: CLINIC | Age: 59
End: 2024-04-22
Payer: MEDICAID

## 2024-04-22 NOTE — TELEPHONE ENCOUNTER
Confirm surgery on 04/23/2024 . Please arrive at 6:00 am to main registration at [facility].     Npo after midnight on 04/22/2024    thanks

## 2024-04-23 ENCOUNTER — HOSPITAL ENCOUNTER (OUTPATIENT)
Facility: HOSPITAL | Age: 59
Setting detail: HOSPITAL OUTPATIENT SURGERY
Discharge: HOME | End: 2024-04-23
Attending: OBSTETRICS & GYNECOLOGY | Admitting: OBSTETRICS & GYNECOLOGY
Payer: MEDICAID

## 2024-04-23 ENCOUNTER — ANESTHESIA (OUTPATIENT)
Dept: PERIOP | Facility: HOSPITAL | Age: 59
End: 2024-04-23
Payer: MEDICAID

## 2024-04-23 VITALS
HEART RATE: 76 BPM | BODY MASS INDEX: 34.91 KG/M2 | TEMPERATURE: 98.1 F | WEIGHT: 197 LBS | SYSTOLIC BLOOD PRESSURE: 133 MMHG | HEIGHT: 63 IN | DIASTOLIC BLOOD PRESSURE: 81 MMHG | OXYGEN SATURATION: 97 % | RESPIRATION RATE: 16 BRPM

## 2024-04-23 DIAGNOSIS — R19.00 PELVIC MASS: ICD-10-CM

## 2024-04-23 LAB
ABO GROUP BLD: NORMAL
BLD GP AB SCN SERPL QL: NEGATIVE
GLUCOSE BLDC GLUCOMTR-MCNC: 255 MG/DL (ref 70–130)
GLUCOSE BLDC GLUCOMTR-MCNC: 263 MG/DL (ref 70–130)
RH BLD: POSITIVE
T&S EXPIRATION DATE: NORMAL

## 2024-04-23 PROCEDURE — 25010000002 FENTANYL CITRATE (PF) 100 MCG/2ML SOLUTION

## 2024-04-23 PROCEDURE — 25010000002 ONDANSETRON PER 1 MG

## 2024-04-23 PROCEDURE — 25010000002 DEXAMETHASONE PER 1 MG

## 2024-04-23 PROCEDURE — 25810000003 LACTATED RINGERS PER 1000 ML: Performed by: ANESTHESIOLOGY

## 2024-04-23 PROCEDURE — 86850 RBC ANTIBODY SCREEN: CPT | Performed by: OBSTETRICS & GYNECOLOGY

## 2024-04-23 PROCEDURE — 25010000002 PROPOFOL 10 MG/ML EMULSION

## 2024-04-23 PROCEDURE — 63710000001 INSULIN REGULAR HUMAN PER 5 UNITS: Performed by: ANESTHESIOLOGY

## 2024-04-23 PROCEDURE — 58661 LAPAROSCOPY REMOVE ADNEXA: CPT | Performed by: PHYSICIAN ASSISTANT

## 2024-04-23 PROCEDURE — 86901 BLOOD TYPING SEROLOGIC RH(D): CPT | Performed by: OBSTETRICS & GYNECOLOGY

## 2024-04-23 PROCEDURE — 25810000003 SODIUM CHLORIDE PER 500 ML: Performed by: OBSTETRICS & GYNECOLOGY

## 2024-04-23 PROCEDURE — 88307 TISSUE EXAM BY PATHOLOGIST: CPT | Performed by: OBSTETRICS & GYNECOLOGY

## 2024-04-23 PROCEDURE — 86900 BLOOD TYPING SEROLOGIC ABO: CPT | Performed by: OBSTETRICS & GYNECOLOGY

## 2024-04-23 PROCEDURE — 82948 REAGENT STRIP/BLOOD GLUCOSE: CPT

## 2024-04-23 PROCEDURE — 63710000001 INSULIN LISPRO (HUMAN) PER 5 UNITS

## 2024-04-23 PROCEDURE — 88331 PATH CONSLTJ SURG 1 BLK 1SPC: CPT | Performed by: PATHOLOGY

## 2024-04-23 PROCEDURE — 58661 LAPAROSCOPY REMOVE ADNEXA: CPT | Performed by: OBSTETRICS & GYNECOLOGY

## 2024-04-23 PROCEDURE — 25010000002 FENTANYL CITRATE (PF) 50 MCG/ML SOLUTION

## 2024-04-23 PROCEDURE — 25010000002 CEFAZOLIN PER 500 MG: Performed by: OBSTETRICS & GYNECOLOGY

## 2024-04-23 PROCEDURE — 25010000002 HEPARIN (PORCINE) PER 1000 UNITS: Performed by: OBSTETRICS & GYNECOLOGY

## 2024-04-23 PROCEDURE — 25010000002 SUGAMMADEX 200 MG/2ML SOLUTION

## 2024-04-23 RX ORDER — PROMETHAZINE HYDROCHLORIDE 25 MG/1
25 TABLET ORAL ONCE AS NEEDED
Status: DISCONTINUED | OUTPATIENT
Start: 2024-04-23 | End: 2024-04-23 | Stop reason: HOSPADM

## 2024-04-23 RX ORDER — HYDRALAZINE HYDROCHLORIDE 20 MG/ML
5 INJECTION INTRAMUSCULAR; INTRAVENOUS
Status: DISCONTINUED | OUTPATIENT
Start: 2024-04-23 | End: 2024-04-23 | Stop reason: HOSPADM

## 2024-04-23 RX ORDER — SCOLOPAMINE TRANSDERMAL SYSTEM 1 MG/1
1 PATCH, EXTENDED RELEASE TRANSDERMAL CONTINUOUS
Status: DISCONTINUED | OUTPATIENT
Start: 2024-04-23 | End: 2024-04-23 | Stop reason: HOSPADM

## 2024-04-23 RX ORDER — SODIUM CHLORIDE 0.9 % (FLUSH) 0.9 %
10 SYRINGE (ML) INJECTION AS NEEDED
Status: DISCONTINUED | OUTPATIENT
Start: 2024-04-23 | End: 2024-04-23 | Stop reason: HOSPADM

## 2024-04-23 RX ORDER — HYDROCODONE BITARTRATE AND ACETAMINOPHEN 5; 325 MG/1; MG/1
TABLET ORAL
Status: COMPLETED
Start: 2024-04-23 | End: 2024-04-23

## 2024-04-23 RX ORDER — FENTANYL CITRATE 50 UG/ML
INJECTION, SOLUTION INTRAMUSCULAR; INTRAVENOUS
Status: COMPLETED
Start: 2024-04-23 | End: 2024-04-23

## 2024-04-23 RX ORDER — NALOXONE HCL 0.4 MG/ML
0.4 VIAL (ML) INJECTION AS NEEDED
Status: DISCONTINUED | OUTPATIENT
Start: 2024-04-23 | End: 2024-04-23 | Stop reason: HOSPADM

## 2024-04-23 RX ORDER — BUPIVACAINE HYDROCHLORIDE AND EPINEPHRINE 5; 5 MG/ML; UG/ML
INJECTION, SOLUTION PERINEURAL AS NEEDED
Status: DISCONTINUED | OUTPATIENT
Start: 2024-04-23 | End: 2024-04-23 | Stop reason: HOSPADM

## 2024-04-23 RX ORDER — ROCURONIUM BROMIDE 10 MG/ML
INJECTION, SOLUTION INTRAVENOUS AS NEEDED
Status: DISCONTINUED | OUTPATIENT
Start: 2024-04-23 | End: 2024-04-23 | Stop reason: SURG

## 2024-04-23 RX ORDER — HEPARIN SODIUM 5000 [USP'U]/ML
5000 INJECTION, SOLUTION INTRAVENOUS; SUBCUTANEOUS ONCE
Status: DISCONTINUED | OUTPATIENT
Start: 2024-04-23 | End: 2024-04-23 | Stop reason: HOSPADM

## 2024-04-23 RX ORDER — IPRATROPIUM BROMIDE AND ALBUTEROL SULFATE 2.5; .5 MG/3ML; MG/3ML
3 SOLUTION RESPIRATORY (INHALATION) ONCE AS NEEDED
Status: DISCONTINUED | OUTPATIENT
Start: 2024-04-23 | End: 2024-04-23 | Stop reason: HOSPADM

## 2024-04-23 RX ORDER — EPHEDRINE SULFATE 50 MG/ML
INJECTION INTRAVENOUS AS NEEDED
Status: DISCONTINUED | OUTPATIENT
Start: 2024-04-23 | End: 2024-04-23 | Stop reason: SURG

## 2024-04-23 RX ORDER — FENTANYL CITRATE 50 UG/ML
INJECTION, SOLUTION INTRAMUSCULAR; INTRAVENOUS AS NEEDED
Status: DISCONTINUED | OUTPATIENT
Start: 2024-04-23 | End: 2024-04-23 | Stop reason: SURG

## 2024-04-23 RX ORDER — TRAMADOL HYDROCHLORIDE 50 MG/1
50 TABLET ORAL EVERY 6 HOURS PRN
Qty: 5 TABLET | Refills: 0 | Status: SHIPPED | OUTPATIENT
Start: 2024-04-23

## 2024-04-23 RX ORDER — PREGABALIN 150 MG/1
150 CAPSULE ORAL ONCE
Status: COMPLETED | OUTPATIENT
Start: 2024-04-23 | End: 2024-04-23

## 2024-04-23 RX ORDER — DEXAMETHASONE SODIUM PHOSPHATE 4 MG/ML
INJECTION, SOLUTION INTRA-ARTICULAR; INTRALESIONAL; INTRAMUSCULAR; INTRAVENOUS; SOFT TISSUE AS NEEDED
Status: DISCONTINUED | OUTPATIENT
Start: 2024-04-23 | End: 2024-04-23 | Stop reason: SURG

## 2024-04-23 RX ORDER — INSULIN LISPRO 100 [IU]/ML
INJECTION, SOLUTION INTRAVENOUS; SUBCUTANEOUS
Status: COMPLETED
Start: 2024-04-23 | End: 2024-04-23

## 2024-04-23 RX ORDER — FAMOTIDINE 20 MG/1
20 TABLET, FILM COATED ORAL
Status: COMPLETED | OUTPATIENT
Start: 2024-04-23 | End: 2024-04-23

## 2024-04-23 RX ORDER — LIDOCAINE HYDROCHLORIDE 10 MG/ML
INJECTION, SOLUTION EPIDURAL; INFILTRATION; INTRACAUDAL; PERINEURAL AS NEEDED
Status: DISCONTINUED | OUTPATIENT
Start: 2024-04-23 | End: 2024-04-23 | Stop reason: SURG

## 2024-04-23 RX ORDER — SODIUM CHLORIDE 0.9 % (FLUSH) 0.9 %
10 SYRINGE (ML) INJECTION EVERY 12 HOURS SCHEDULED
Status: DISCONTINUED | OUTPATIENT
Start: 2024-04-23 | End: 2024-04-23 | Stop reason: HOSPADM

## 2024-04-23 RX ORDER — ONDANSETRON 2 MG/ML
4 INJECTION INTRAMUSCULAR; INTRAVENOUS ONCE AS NEEDED
Status: COMPLETED | OUTPATIENT
Start: 2024-04-23 | End: 2024-04-23

## 2024-04-23 RX ORDER — ONDANSETRON 2 MG/ML
INJECTION INTRAMUSCULAR; INTRAVENOUS
Status: COMPLETED
Start: 2024-04-23 | End: 2024-04-23

## 2024-04-23 RX ORDER — MIDAZOLAM HYDROCHLORIDE 1 MG/ML
1 INJECTION INTRAMUSCULAR; INTRAVENOUS
Status: DISCONTINUED | OUTPATIENT
Start: 2024-04-23 | End: 2024-04-23 | Stop reason: HOSPADM

## 2024-04-23 RX ORDER — ONDANSETRON 2 MG/ML
INJECTION INTRAMUSCULAR; INTRAVENOUS AS NEEDED
Status: DISCONTINUED | OUTPATIENT
Start: 2024-04-23 | End: 2024-04-23 | Stop reason: SURG

## 2024-04-23 RX ORDER — SODIUM CHLORIDE 0.9 % (FLUSH) 0.9 %
3 SYRINGE (ML) INJECTION EVERY 12 HOURS SCHEDULED
Status: DISCONTINUED | OUTPATIENT
Start: 2024-04-23 | End: 2024-04-23 | Stop reason: HOSPADM

## 2024-04-23 RX ORDER — LIDOCAINE HYDROCHLORIDE 10 MG/ML
0.5 INJECTION, SOLUTION EPIDURAL; INFILTRATION; INTRACAUDAL; PERINEURAL ONCE AS NEEDED
Status: COMPLETED | OUTPATIENT
Start: 2024-04-23 | End: 2024-04-23

## 2024-04-23 RX ORDER — HEPARIN SODIUM 5000 [USP'U]/ML
INJECTION, SOLUTION INTRAVENOUS; SUBCUTANEOUS AS NEEDED
Status: DISCONTINUED | OUTPATIENT
Start: 2024-04-23 | End: 2024-04-23 | Stop reason: HOSPADM

## 2024-04-23 RX ORDER — IBUPROFEN 600 MG/1
600 TABLET ORAL EVERY 6 HOURS PRN
Qty: 30 TABLET | Refills: 0 | Status: SHIPPED | OUTPATIENT
Start: 2024-04-23

## 2024-04-23 RX ORDER — SODIUM CHLORIDE 9 MG/ML
40 INJECTION, SOLUTION INTRAVENOUS AS NEEDED
Status: DISCONTINUED | OUTPATIENT
Start: 2024-04-23 | End: 2024-04-23 | Stop reason: HOSPADM

## 2024-04-23 RX ORDER — HYDROMORPHONE HYDROCHLORIDE 1 MG/ML
0.5 INJECTION, SOLUTION INTRAMUSCULAR; INTRAVENOUS; SUBCUTANEOUS
Status: DISCONTINUED | OUTPATIENT
Start: 2024-04-23 | End: 2024-04-23 | Stop reason: HOSPADM

## 2024-04-23 RX ORDER — DOCUSATE SODIUM 100 MG/1
200 CAPSULE, LIQUID FILLED ORAL 2 TIMES DAILY PRN
Qty: 60 CAPSULE | Refills: 3 | Status: SHIPPED | OUTPATIENT
Start: 2024-04-23

## 2024-04-23 RX ORDER — ONDANSETRON 4 MG/1
4 TABLET, FILM COATED ORAL EVERY 6 HOURS PRN
Qty: 5 TABLET | Refills: 0 | Status: SHIPPED | OUTPATIENT
Start: 2024-04-23

## 2024-04-23 RX ORDER — ACETAMINOPHEN 500 MG
1000 TABLET ORAL ONCE
Status: COMPLETED | OUTPATIENT
Start: 2024-04-23 | End: 2024-04-23

## 2024-04-23 RX ORDER — LABETALOL HYDROCHLORIDE 5 MG/ML
5 INJECTION, SOLUTION INTRAVENOUS
Status: DISCONTINUED | OUTPATIENT
Start: 2024-04-23 | End: 2024-04-23 | Stop reason: HOSPADM

## 2024-04-23 RX ORDER — FENTANYL CITRATE 50 UG/ML
50 INJECTION, SOLUTION INTRAMUSCULAR; INTRAVENOUS
Status: DISCONTINUED | OUTPATIENT
Start: 2024-04-23 | End: 2024-04-23 | Stop reason: HOSPADM

## 2024-04-23 RX ORDER — PROMETHAZINE HYDROCHLORIDE 25 MG/1
25 SUPPOSITORY RECTAL ONCE AS NEEDED
Status: DISCONTINUED | OUTPATIENT
Start: 2024-04-23 | End: 2024-04-23 | Stop reason: HOSPADM

## 2024-04-23 RX ORDER — DROPERIDOL 2.5 MG/ML
0.62 INJECTION, SOLUTION INTRAMUSCULAR; INTRAVENOUS
Status: DISCONTINUED | OUTPATIENT
Start: 2024-04-23 | End: 2024-04-23 | Stop reason: HOSPADM

## 2024-04-23 RX ORDER — SODIUM CHLORIDE 0.9 % (FLUSH) 0.9 %
3-10 SYRINGE (ML) INJECTION AS NEEDED
Status: DISCONTINUED | OUTPATIENT
Start: 2024-04-23 | End: 2024-04-23 | Stop reason: HOSPADM

## 2024-04-23 RX ORDER — PROPOFOL 10 MG/ML
VIAL (ML) INTRAVENOUS AS NEEDED
Status: DISCONTINUED | OUTPATIENT
Start: 2024-04-23 | End: 2024-04-23 | Stop reason: SURG

## 2024-04-23 RX ORDER — CELECOXIB 200 MG/1
200 CAPSULE ORAL ONCE
Status: COMPLETED | OUTPATIENT
Start: 2024-04-23 | End: 2024-04-23

## 2024-04-23 RX ORDER — ACETAMINOPHEN 325 MG/1
650 TABLET ORAL EVERY 6 HOURS PRN
Qty: 30 TABLET | Refills: 0 | Status: SHIPPED | OUTPATIENT
Start: 2024-04-23

## 2024-04-23 RX ORDER — SODIUM CHLORIDE 9 MG/ML
INJECTION, SOLUTION INTRAVENOUS AS NEEDED
Status: DISCONTINUED | OUTPATIENT
Start: 2024-04-23 | End: 2024-04-23 | Stop reason: HOSPADM

## 2024-04-23 RX ORDER — SODIUM CHLORIDE, SODIUM LACTATE, POTASSIUM CHLORIDE, CALCIUM CHLORIDE 600; 310; 30; 20 MG/100ML; MG/100ML; MG/100ML; MG/100ML
9 INJECTION, SOLUTION INTRAVENOUS CONTINUOUS PRN
Status: DISCONTINUED | OUTPATIENT
Start: 2024-04-23 | End: 2024-04-23 | Stop reason: HOSPADM

## 2024-04-23 RX ORDER — DROPERIDOL 2.5 MG/ML
0.62 INJECTION, SOLUTION INTRAMUSCULAR; INTRAVENOUS ONCE AS NEEDED
Status: DISCONTINUED | OUTPATIENT
Start: 2024-04-23 | End: 2024-04-23 | Stop reason: HOSPADM

## 2024-04-23 RX ORDER — HYDROCODONE BITARTRATE AND ACETAMINOPHEN 5; 325 MG/1; MG/1
1 TABLET ORAL ONCE AS NEEDED
Status: DISCONTINUED | OUTPATIENT
Start: 2024-04-23 | End: 2024-04-23 | Stop reason: HOSPADM

## 2024-04-23 RX ADMIN — SODIUM CHLORIDE 2000 MG: 900 INJECTION INTRAVENOUS at 07:50

## 2024-04-23 RX ADMIN — ROCURONIUM BROMIDE 30 MG: 10 INJECTION INTRAVENOUS at 08:14

## 2024-04-23 RX ADMIN — SODIUM CHLORIDE, POTASSIUM CHLORIDE, SODIUM LACTATE AND CALCIUM CHLORIDE 9 ML/HR: 600; 310; 30; 20 INJECTION, SOLUTION INTRAVENOUS at 06:51

## 2024-04-23 RX ADMIN — FENTANYL CITRATE 50 MCG: 50 INJECTION, SOLUTION INTRAMUSCULAR; INTRAVENOUS at 08:24

## 2024-04-23 RX ADMIN — ONDANSETRON 4 MG: 2 INJECTION INTRAMUSCULAR; INTRAVENOUS at 09:49

## 2024-04-23 RX ADMIN — ACETAMINOPHEN 1000 MG: 500 TABLET ORAL at 06:29

## 2024-04-23 RX ADMIN — CELECOXIB 200 MG: 200 CAPSULE ORAL at 06:29

## 2024-04-23 RX ADMIN — FENTANYL CITRATE 50 MCG: 50 INJECTION, SOLUTION INTRAMUSCULAR; INTRAVENOUS at 09:47

## 2024-04-23 RX ADMIN — FAMOTIDINE 20 MG: 20 TABLET, FILM COATED ORAL at 06:29

## 2024-04-23 RX ADMIN — ROCURONIUM BROMIDE 50 MG: 10 INJECTION INTRAVENOUS at 07:40

## 2024-04-23 RX ADMIN — PROPOFOL 25 MCG/KG/MIN: 10 INJECTION, EMULSION INTRAVENOUS at 07:50

## 2024-04-23 RX ADMIN — SUGAMMADEX 200 MG: 100 INJECTION, SOLUTION INTRAVENOUS at 08:56

## 2024-04-23 RX ADMIN — SCOPOLAMINE 1 PATCH: 1.5 PATCH, EXTENDED RELEASE TRANSDERMAL at 06:29

## 2024-04-23 RX ADMIN — LIDOCAINE HYDROCHLORIDE 50 MG: 10 INJECTION, SOLUTION EPIDURAL; INFILTRATION; INTRACAUDAL; PERINEURAL at 07:40

## 2024-04-23 RX ADMIN — EPHEDRINE SULFATE 10 MG: 50 INJECTION INTRAVENOUS at 08:03

## 2024-04-23 RX ADMIN — PROPOFOL 200 MG: 10 INJECTION, EMULSION INTRAVENOUS at 07:40

## 2024-04-23 RX ADMIN — SODIUM CHLORIDE, POTASSIUM CHLORIDE, SODIUM LACTATE AND CALCIUM CHLORIDE: 600; 310; 30; 20 INJECTION, SOLUTION INTRAVENOUS at 08:42

## 2024-04-23 RX ADMIN — LIDOCAINE HYDROCHLORIDE 0.5 ML: 10 INJECTION, SOLUTION EPIDURAL; INFILTRATION; INTRACAUDAL; PERINEURAL at 06:29

## 2024-04-23 RX ADMIN — INSULIN HUMAN 5 UNITS: 100 INJECTION, SOLUTION PARENTERAL at 07:15

## 2024-04-23 RX ADMIN — HYDROCODONE BITARTRATE AND ACETAMINOPHEN 1 TABLET: 5; 325 TABLET ORAL at 10:34

## 2024-04-23 RX ADMIN — FENTANYL CITRATE 50 MCG: 50 INJECTION, SOLUTION INTRAMUSCULAR; INTRAVENOUS at 07:38

## 2024-04-23 RX ADMIN — DEXAMETHASONE SODIUM PHOSPHATE 8 MG: 4 INJECTION, SOLUTION INTRA-ARTICULAR; INTRALESIONAL; INTRAMUSCULAR; INTRAVENOUS; SOFT TISSUE at 07:47

## 2024-04-23 RX ADMIN — PREGABALIN 150 MG: 150 CAPSULE ORAL at 06:29

## 2024-04-23 RX ADMIN — ONDANSETRON 4 MG: 2 INJECTION INTRAMUSCULAR; INTRAVENOUS at 08:55

## 2024-04-23 RX ADMIN — INSULIN LISPRO 6 UNITS: 100 INJECTION, SOLUTION INTRAVENOUS; SUBCUTANEOUS at 09:41

## 2024-04-23 NOTE — OP NOTE
Subjective     Date of Service:  04/23/24  Time of Service:  09:07 EDT    Surgical Staff: Surgeons and Role:     * Leanna Vegas MD - Primary   Additional Staff:   Assistant: José Miguel Ford PA-C  was responsible for performing the following activities: Retraction, Suction, Irrigation, Closing, and Placing Dressing and their skilled assistance was necessary for the success of this case.     Pre-operative diagnosis(es): Pre-Op Diagnosis Codes:     * Pelvic mass [R19.00]     Post-operative diagnosis(es): Post-Op Diagnosis Codes:     * Pelvic mass [R19.00]   Procedure(s): Procedure(s):  DIAGNOSTIC LAPAROSCOPY, SALPINGO OOPHORECTOMY ROBOTIC LAPAROSCOPIC ASSISTED     Antibiotics: cefazolin (Ancef) ordered on call to OR     Anesthesia: Type: General  ASA:  III     Objective      Operative findings: At the time of placement of the abdominal instruments, there is diffuse skin excoriation with satellite like lesions throughout the abdominal area.  There is cutaneous yeast at the inframammary folds.  At the time of laparoscopy, there is an enlarged left adnexal cystic mass that appeared partially decompressed.  There was adhesive disease of the omentum to the anterior abdominal wall and adhesive disease of the colon to the vaginal cuff and left pelvic sidewall.  Left ovary was adhered to the left pelvic sidewall as well. On frozen section of the bilateral ovaries and tubes, no cancer was noted.  Possible fibroma.   Specimens removed: ID Type Source Tests Collected by Time   A :  Tissue Ovaries, Bilateral with Fallopian Tubes TISSUE PATHOLOGY EXAM Leanna Vegas MD 4/23/2024 0835      Fluid Intake and Output: I/O this shift:  In: 1300 [I.V.:1200; IV Piggyback:100]  Out: - Estimated blood loss 25 mL.  Urine output 100 mL.   Blood products used: No   Drains: Urethral Catheter Silicone 16 Fr. (Active)      Implant Information: Nothing was implanted during the procedure   Complications: No immediate   Intraoperative  consult(s):    Condition: stable   Disposition: to PACU and then discharge home       Indications:  Patient's pleasant 59-year-old woman who is diagnosed with an adnexal mass.  Risks and benefits of surgery were discussed.  Consent was signed and on chart.    Procedure: After obtaining informed consent, the patient was taken to the operating room and underwent general endotracheal anesthesia after patient and site verification. The feet were placed in Олег stirrups. The arms were tucked at the sides. Steep Trendelenburg positioning was tested and found to be adequate. The abdomen, perineum, and vagina were prepped and draped in the usual sterile fashion. Fuller catheter was anchored. Attention was turned to the abdomen. Prior to each incision, skin was injected with 0.5% Marcaine with epinephrine.  Varies needle was inserted at the umbilicus and the abdomen was insufflated to pressure 15 mmHg with CO2 gas.  An 8 mm trocar was inserted at the umbilical midline position without difficulty.  Laparoscope was introduced to confirm positioning. Steep Trendelenburg was called for.  2 left-sided 8 mm and 2 right-sided 8 mm trochars were all placed under direct visualization.  The above findings were noted.  Da Sheryl robot was docked to the patient and surgeon retired to the operating console.   Pelvic washings were obtained and later discarded.  Adhesional lysis was performed.  The peritoneum overlying the pelvic sidewalls was divided with monopolar scissors. Infundibulopelvic ligaments were isolated from surrounding structures and pedicles were created using bipolar cautery and scissors.  All residual attachments of the specimen to the patient were serially divided.  Surgical field was irrigated and aspirated.  Good hemostasis was noted. Additional hemostasis was achieved at the pelvis as needed using bipolar cautery. Da Sheryl robot was undocked from the patient and the surgeon returned to the bedside.  15 mm bag was  placed by the umbilicus after extending the incision and specimens were placed in the bag without spillage of contents.  Specimens were sent for frozen section with the above diagnosis.  Suture passer and 0 Vicryl suture times a single stitch was used to reapproximate the fascia at the umbilical site trocar.  At that point, no fascial defects could be palpated.  Trochars were removed under direct visualization.  CO2 gas was allowed to escape from the abdominal cavity.  The skin was closed with 3-0 Monocryl in subcuticular stitch and glue was placed at the skin. Bladder was back filled with 120 mL of sterile solution and the kirkland was discontinued.  The patient was taken to the recovery room in good condition. There were no immediate complications. All counts were correct.          Leanna Vegas MD  04/23/24  09:07 EDT

## 2024-04-23 NOTE — ANESTHESIA PREPROCEDURE EVALUATION
Anesthesia Evaluation     Patient summary reviewed and Nursing notes reviewed   history of anesthetic complications:  prolonged sedation  NPO Solid Status: > 8 hours  NPO Liquid Status: > 2 hours           Airway   Mallampati: I  TM distance: >3 FB  Neck ROM: full  No difficulty expected  Dental    (+) poor dentition    Pulmonary    (+) ,sleep apnea, decreased breath sounds  Cardiovascular   Exercise tolerance: good (4-7 METS)    Patient on routine beta blocker and Beta blocker given within 24 hours of surgery  Rhythm: regular  Rate: normal    (+) hypertension well controlled 2 medications or greater, hyperlipidemia      Neuro/Psych  (+) CVA residual symptoms, psychiatric history Anxiety    ROS Comment: R-SIDED WEAKNESS  GI/Hepatic/Renal/Endo    (+) obesity, GERD well controlled, renal disease- CRI, diabetes mellitus type 2 well controlled using insulin    Musculoskeletal     (+) back pain  Abdominal   (+) obese    Abdomen: soft.   Substance History      OB/GYN          Other   arthritis,                 Anesthesia Plan    ASA 3     general     intravenous induction     Anesthetic plan, risks, benefits, and alternatives have been provided, discussed and informed consent has been obtained with: patient.    Plan discussed with CRNA.    CODE STATUS:

## 2024-04-23 NOTE — DISCHARGE INSTRUCTIONS
1) No driving for 1-2 weeks and no longer taking narcotics.   2) May shower / sponge bathe >24 hours after surgery, No tub baths/soaking  3) Do not lift / push / pull more then 20 lb. for 6 weeks  4) Pelvic rest for 3 weeks  5) Constipation is a common postoperative complaint.  Please use a stool softeners and laxatives as needed to facilitate bowel movements.  6) If you are discharged with an abdominal binder, this is to be used as needed for incisional comfort.    Dr. Vegas's Office [phone]     Follow-up May 15, 2024 at 1:45 pm

## 2024-04-23 NOTE — INTERVAL H&P NOTE
Pre-Op H&P  Yasmeen Rainey  [id number]  1965      Chief complaint:. Ovarian mass      Subjective:  Yasmeen Rainey is a 59 y.o.female with a history of partial hysterectomy presents for scheduled surgery by Dr. Vegas.  She anticipates a salpingo oophorectomy today.  Patient reports she initially had pain and trouble walking.  Imaging revealed a pelvic mass on CT, and large complex cyst on the adnexa.  Normal CA-125. No changes since last seen in clinic.      Review of Systems:  Constitutional-- No fever, chills or sweats. No fatigue.  CV-- No chest pain, palpitation or syncope  Resp-- No SOB, cough, hemoptysis  Skin--No rashes or lesions      Allergies:   Allergies   Allergen Reactions    Penicillins Unknown - Low Severity     Unsure, several siblings with allergy and patients parent could not recall which child, unsure of reaction     Morphine Nausea And Vomiting         Home Meds:  Medications Prior to Admission   Medication Sig Dispense Refill Last Dose    amLODIPine (NORVASC) 10 MG tablet Take 1 tablet by mouth Every Evening.   4/22/2024    atorvastatin (LIPITOR) 80 MG tablet Take 1 tablet by mouth Every Night.   4/22/2024    diclofenac (VOLTAREN) 75 MG EC tablet Take 1 tablet by mouth 2 (Two) Times a Day With Meals.   4/22/2024    metFORMIN ER (GLUCOPHAGE-XR) 750 MG 24 hr tablet Take 1 tablet by mouth Daily.   4/22/2024    metoprolol succinate XL (TOPROL-XL) 50 MG 24 hr tablet Take 1 tablet by mouth Daily.   4/23/2024 at 0330    venlafaxine XR (EFFEXOR-XR) 150 MG 24 hr capsule Take 1 capsule by mouth Daily.   4/22/2024    Lantus SoloStar 100 UNIT/ML injection pen 10 Units Every Night.   4/21/2024    methocarbamol (ROBAXIN) 500 MG tablet Take 1 tablet by mouth 3 (Three) Times a Day As Needed for Muscle Spasms. for pain   More than a month    pioglitazone (ACTOS) 30 MG tablet Take 1 tablet by mouth Daily.   More than a month    valsartan (DIOVAN) 320 MG tablet Take 1 tablet by mouth Daily.   4/21/2024  "        PMH:   Past Medical History:   Diagnosis Date    Anesthesia complication     slow to awaken    Anxiety     Arthritis     Back pain     Bone pain     CVA (cerebral vascular accident)      and , right sided weakness    Depression     Diabetes mellitus     GERD (gastroesophageal reflux disease)     High blood pressure     Hyperlipidemia     Joint pain     Kidney cysts     left    Muscle pain     Ovarian cyst     left     PSH:    Past Surgical History:   Procedure Laterality Date    APPENDECTOMY       SECTION      GALLBLADDER SURGERY      HYSTERECTOMY      Partial       Immunization History:  Influenza: No  Pneumococcal: No  Tetanus: Yes  Covid : No    Social History:   Tobacco:   Social History     Tobacco Use   Smoking Status Former    Current packs/day: 0.00    Average packs/day: 0.3 packs/day for 42.0 years (10.5 ttl pk-yrs)    Types: Cigarettes    Start date:     Quit date:     Years since quittin.3   Smokeless Tobacco Never      Alcohol:     Social History     Substance and Sexual Activity   Alcohol Use Not Currently         Physical Exam:/98 (BP Location: Right arm, Patient Position: Lying)   Pulse 86   Temp 97 °F (36.1 °C) (Temporal)   Resp 16   Ht 160 cm (63\")   Wt 89.4 kg (197 lb)   SpO2 94%   BMI 34.90 kg/m²       General Appearance:    Alert, cooperative, no distress, appears stated age   Head:    Normocephalic, without obvious abnormality, atraumatic   Lungs:     Clear to auscultation bilaterally, respirations unlabored    Heart:   Regular rate and rhythm, S1 and S2 normal    Abdomen:    Soft without tenderness   Extremities:   Extremities normal, atraumatic, no cyanosis or edema   Skin:   Skin color, texture, turgor normal, no rashes or lesions   Neurologic:   Grossly intact     Results Review:     LABS:  Lab Results   Component Value Date    WBC 10.75 2024    HGB 13.3 2024    HCT 42.3 2024    MCV 82.3 2024     2024    " NEUTROABS 7.23 (H) 04/16/2024    GLUCOSE 280 (H) 04/16/2024    BUN 9 04/16/2024    CREATININE 0.56 (L) 04/16/2024     (L) 04/16/2024    K 4.0 04/16/2024    CL 98 04/16/2024    CO2 24.0 04/16/2024    CALCIUM 9.4 04/16/2024    ALBUMIN 3.9 04/16/2024    AST 25 04/16/2024    ALT 28 04/16/2024    BILITOT 0.8 04/16/2024       Cancer Staging (if applicable)  Cancer Patient: __ yes __no X unknown; If yes, clinical stage T:__ N:__M:__, stage group or __N/A      Impression: Complex left ovarian mass      Plan: Diagnostic laparoscopy, salpingo-oophorectomy robotic laparoscopic assisted with possible staging, Dr. Shasta Torres PA-C   4/23/2024   06:53 EDT    I saw and evaluated the patient. I agree with the findings and the plan of care as documented in the note.    Leanna Vegas MD  04/23/24  07:06 EDT

## 2024-04-23 NOTE — ANESTHESIA POSTPROCEDURE EVALUATION
Patient: Yasmeen Rainey    Procedure Summary       Date: 04/23/24 Room / Location:  [facility] [address] [place] [address] [facility]    Anesthesia Start: 0734 Anesthesia Stop: 0916    Procedure: DIAGNOSTIC LAPAROSCOPY, SALPINGO OOPHORECTOMY ROBOTIC LAPAROSCOPIC ASSISTED (Abdomen) Diagnosis:       Pelvic mass      (Pelvic mass [R19.00])    Surgeons: Leanna Vegas MD Provider: José Miguel Huertas MD    Anesthesia Type: general ASA Status: 3            Anesthesia Type: general    Vitals  Vitals Value Taken Time   /85 04/23/24 0916   Temp 97.3 °F (36.3 °C) 04/23/24 0916   Pulse 74 04/23/24 0916   Resp     SpO2 94 % 04/23/24 0916           Post Anesthesia Care and Evaluation    Patient location during evaluation: PACU  Patient participation: waiting for patient participation  Level of consciousness: sleepy but conscious  Pain management: adequate    Airway patency: patent  Anesthetic complications: No anesthetic complications  PONV Status: none  Cardiovascular status: hemodynamically stable and acceptable  Respiratory status: nonlabored ventilation, acceptable and nasal cannula  Hydration status: acceptable

## 2024-04-23 NOTE — ANESTHESIA PROCEDURE NOTES
Airway  Urgency: elective    Date/Time: 4/23/2024 7:45 AM  Airway not difficult    General Information and Staff    Patient location during procedure: OR  CRNA/CAA: Norah Mckeon CRNA    Indications and Patient Condition  Indications for airway management: airway protection    Preoxygenated: yes  MILS not maintained throughout  Mask difficulty assessment: 1 - vent by mask    Final Airway Details  Final airway type: endotracheal airway      Successful airway: ETT  Cuffed: yes   Successful intubation technique: direct laryngoscopy  Facilitating devices/methods: intubating stylet  Endotracheal tube insertion site: oral  Blade: Robert  Blade size: 3  ETT size (mm): 7.0  Cormack-Lehane Classification: grade I - full view of glottis  Placement verified by: chest auscultation and capnometry   Measured from: lips  ETT/EBT  to lips (cm): 20  Number of attempts at approach: 1  Assessment: lips, teeth, and gum same as pre-op and atraumatic intubation    Additional Comments  Negative epigastric sounds, Breath sound equal bilaterally with symmetric chest rise and fall

## 2024-04-24 LAB
CYTO UR: NORMAL
LAB AP CASE REPORT: NORMAL
LAB AP CLINICAL INFORMATION: NORMAL
Lab: NORMAL
PATH REPORT.FINAL DX SPEC: NORMAL
PATH REPORT.GROSS SPEC: NORMAL

## 2024-04-25 ENCOUNTER — TELEPHONE (OUTPATIENT)
Dept: GYNECOLOGIC ONCOLOGY | Facility: CLINIC | Age: 59
End: 2024-04-25
Payer: MEDICAID

## 2024-04-25 NOTE — TELEPHONE ENCOUNTER
Patient called for post-op f/u.  No usage of provided Tramadol, using motrin and tylenol.  Up moving about unassisted in home, positive for BM and voiding well. Eating well, encouraged to focus on protein. Denies any c/o or questions, verified next appt and time. Offers thank you for care.

## 2024-04-25 NOTE — TELEPHONE ENCOUNTER
RN spoke to patient's daughter and reported benign pathology. Daughter verbalized understanding and told RN that she will pass the information along to the patient.   ----- Message from Leanna Vegas MD sent at 4/24/2024 10:22 PM EDT -----  Please notify patient of benign final pathology.  Thanks!  ----- Message -----  From: Lab, Background User  Sent: 4/23/2024   9:02 AM EDT  To: Leanna Vegas MD

## 2024-05-14 NOTE — PROGRESS NOTES
"Yasmeen Rainey  [id number]  1965      Reason for Visit: Postoperative evaluation    History of Present Illness:  Patient is a very pleasant 59 y.o. woman who presents for a post operative evaluation status post Diagnostic Laparoscopy, Salpingo Oophorectomy Robotic Laparoscopic Assisted performed on 4/23/2024.      Surgery and hospital course were uncomplicated.  Today, patient notes normal bowel and bladder function.  Her pain is well controlled. She has questions about resuming normal activities.     Past Medical History, Past Surgical History, Social History, Family History have been reviewed and are without significant changes except as mentioned.    Review of Systems   All other systems were reviewed and are negative except as mentioned above.    Medications:  The current medication list was reviewed in the EMR    ALLERGIES:    Allergies   Allergen Reactions    Penicillins Unknown - Low Severity     Unsure, several siblings with allergy and patients parent could not recall which child, unsure of reaction     Morphine Nausea And Vomiting         BP (!) 187/87   Pulse 74   Temp 97.1 °F (36.2 °C) (Temporal)   Resp 17   Ht 160 cm (62.99\")   Wt 89.7 kg (197 lb 12.5 oz)   SpO2 98%   BMI 35.04 kg/m²   ECOG score: 1       Physical Exam  Constitutional:  Patient is a pleasant woman in no acute distress.  Gastrointestinal: Abdomen is soft and appropriately tender.  There is no mass palpated.  There is no rebound or guarding.  Right lateral incision superficial dehiscence, mild erythema. Pinpoint area of dehiscence supraumbilical incision. Other incisions clean dry and intact  Extremities:  Bilateral lower extremities are non-tender.  Gynecologic:External genitalia are free from lesion.  On bimanual examination, no fullness was appreciated.  Uterus, cervix and adnexa were absent.  There was no significant tenderness.  Rectovaginal exam was deferred.      PATHOLOGY:  BILATERAL OVARIES AND FALLOPIAN TUBE, " SALPINGO-OOPHORECTOMY:  Benign simple cyst, favor serous cystadenoma.  Benign ovarian tissue with stromal hyperplasia.  Fallopian tubes with no significant histopathologic change.    ASSESSMENT/PLAN:  Yasmeen Rainey returns for a post-operative evaluation today.  All pathology reports were discussed with the patient.  She quit smoking!!!      Overall, the patient is very pleased with her care.  I recommended continuation of post operative precautions as discussed.     She is to follow-up as needed     Javon Joyce MD  Resident PGY-3, Gynecology     Patient was seen and examined with Dr. Joyce,  resident, who performed portions of the examination and documentation for this patient's care under my direct supervision.  I agree with the above documentation and plan.    Leanna Vegas MD  05/15/24  14:57 EDT

## 2024-05-15 ENCOUNTER — OFFICE VISIT (OUTPATIENT)
Dept: GYNECOLOGIC ONCOLOGY | Facility: CLINIC | Age: 59
End: 2024-05-15
Payer: MEDICAID

## 2024-05-15 VITALS
BODY MASS INDEX: 35.04 KG/M2 | HEIGHT: 63 IN | RESPIRATION RATE: 17 BRPM | SYSTOLIC BLOOD PRESSURE: 187 MMHG | DIASTOLIC BLOOD PRESSURE: 87 MMHG | TEMPERATURE: 97.1 F | OXYGEN SATURATION: 98 % | WEIGHT: 197.78 LBS | HEART RATE: 74 BPM

## 2024-05-15 DIAGNOSIS — Z98.890 POST-OPERATIVE STATE: Primary | ICD-10-CM

## 2024-05-16 ENCOUNTER — PATIENT ROUNDING (BHMG ONLY) (OUTPATIENT)
Dept: GYNECOLOGIC ONCOLOGY | Facility: CLINIC | Age: 59
End: 2024-05-16
Payer: MEDICAID

## 2024-05-16 NOTE — PROGRESS NOTES
A My-Chart message has been sent to the patient for PATIENT ROUNDING with [facility].

## (undated) DEVICE — SUT MNCRYL PLS ANTIB UD 3/0 PS2 27IN

## (undated) DEVICE — TROC BLADLES ANCHORPORT/OPTI LP 8X120MM 1P/U

## (undated) DEVICE — ST TBG AIRSEAL FLTR TRI LUM

## (undated) DEVICE — PATIENT RETURN ELECTRODE, SINGLE-USE, CONTACT QUALITY MONITORING, ADULT, WITH 9FT CORD, FOR PATIENTS WEIGING OVER 33LBS. (15KG): Brand: MEGADYNE

## (undated) DEVICE — TISSUE RETRIEVAL SYSTEM: Brand: INZII RETRIEVAL SYSTEM

## (undated) DEVICE — BLANKT WARM UPPR/BDY ARM/OUT 57X196CM

## (undated) DEVICE — ADHS SKIN PREMIERPRO EXOFIN TOPICAL HI/VISC .5ML

## (undated) DEVICE — UNDERGLV SURG BIOGEL INDICAT PF 61/2 GRN

## (undated) DEVICE — BLADELESS OBTURATOR: Brand: WECK VISTA

## (undated) DEVICE — SCRB SURG BACTOSHIELD CHG 4PCT 4OZ

## (undated) DEVICE — ARM DRAPE

## (undated) DEVICE — COLUMN DRAPE

## (undated) DEVICE — GLV SURG SENSICARE PI MIC PF SZ6 LF STRL

## (undated) DEVICE — APPL CHLORAPREP TINTED 26ML TEAL

## (undated) DEVICE — PK MAJ GYN DAVINCI 10

## (undated) DEVICE — ENDOPATH PNEUMONEEDLE INSUFFLATION NEEDLES WITH LUER LOCK CONNECTORS 150MM: Brand: ENDOPATH

## (undated) DEVICE — SEAL

## (undated) DEVICE — GOWN,NON-REINFORCED,SIRUS,SET IN SLV,XL: Brand: MEDLINE

## (undated) DEVICE — LAPAROVUE VISIBILITY SYSTEM LAPAROSCOPIC SOLUTIONS: Brand: LAPAROVUE

## (undated) DEVICE — TRENDELENBURG WINGPAD POSITIONING KIT DELUXE - WITHOUT BODY STRAP: Brand: SOULE MEDICAL

## (undated) DEVICE — TIP COVER ACCESSORY